# Patient Record
Sex: FEMALE | Race: WHITE | NOT HISPANIC OR LATINO | Employment: FULL TIME | URBAN - METROPOLITAN AREA
[De-identification: names, ages, dates, MRNs, and addresses within clinical notes are randomized per-mention and may not be internally consistent; named-entity substitution may affect disease eponyms.]

---

## 2018-01-22 ENCOUNTER — TELEPHONE (OUTPATIENT)
Dept: OTOLARYNGOLOGY | Facility: CLINIC | Age: 23
End: 2018-01-22

## 2018-01-25 ENCOUNTER — OFFICE VISIT (OUTPATIENT)
Dept: OTOLARYNGOLOGY | Facility: CLINIC | Age: 23
End: 2018-01-25
Payer: OTHER GOVERNMENT

## 2018-01-25 ENCOUNTER — TELEPHONE (OUTPATIENT)
Dept: OTOLARYNGOLOGY | Facility: CLINIC | Age: 23
End: 2018-01-25

## 2018-01-25 VITALS
BODY MASS INDEX: 26.46 KG/M2 | DIASTOLIC BLOOD PRESSURE: 79 MMHG | TEMPERATURE: 98 F | HEIGHT: 64 IN | SYSTOLIC BLOOD PRESSURE: 119 MMHG | HEART RATE: 84 BPM | WEIGHT: 155 LBS

## 2018-01-25 DIAGNOSIS — H60.503 ACUTE OTITIS EXTERNA OF BOTH EARS, UNSPECIFIED TYPE: ICD-10-CM

## 2018-01-25 DIAGNOSIS — J34.2 NASAL SEPTAL DEVIATION: ICD-10-CM

## 2018-01-25 DIAGNOSIS — Z34.91 NORMAL PREGNANCY IN FIRST TRIMESTER: ICD-10-CM

## 2018-01-25 DIAGNOSIS — J30.9 ALLERGIC RHINITIS, UNSPECIFIED CHRONICITY, UNSPECIFIED SEASONALITY, UNSPECIFIED TRIGGER: ICD-10-CM

## 2018-01-25 DIAGNOSIS — H60.8X3 CHRONIC ECZEMATOUS OTITIS EXTERNA OF BOTH EARS: Primary | ICD-10-CM

## 2018-01-25 PROCEDURE — 99204 OFFICE O/P NEW MOD 45 MIN: CPT | Mod: S$GLB,,, | Performed by: SPECIALIST

## 2018-01-25 RX ORDER — NITROFURANTOIN 25; 75 MG/1; MG/1
CAPSULE ORAL
Refills: 0 | COMMUNITY
Start: 2017-11-02

## 2018-01-25 RX ORDER — FLUOCINOLONE ACETONIDE 0.11 MG/ML
3 OIL AURICULAR (OTIC) 2 TIMES DAILY
Qty: 1 BOTTLE | Refills: 5 | Status: SHIPPED | OUTPATIENT
Start: 2018-01-25

## 2018-01-25 RX ORDER — FLUTICASONE PROPIONATE 50 MCG
1 SPRAY, SUSPENSION (ML) NASAL DAILY
COMMUNITY

## 2018-01-25 RX ORDER — CIPROFLOXACIN AND DEXAMETHASONE 3; 1 MG/ML; MG/ML
SUSPENSION/ DROPS AURICULAR (OTIC)
Refills: 3 | COMMUNITY
Start: 2018-01-23

## 2018-01-25 NOTE — PROGRESS NOTES
Subjective:       Patient ID: Tejal Ratliff is a 22 y.o. female.    Chief Complaint: Ear Drainage and Ear Fullness    The patient has been having recurring pain and discharge from her ears for the last 7 months.  She is been diagnosed with swimmer's ear on 2 different occasions and acute otitis media once.  She was a child she had one set of PE tubes.  The ears occasionally are painful.  She does have otorrhea with some brownish material on occasion.  She does use both.  Otic and Ciprodex drops in the past.  She is now 2 months pregnant and her first gestation.  She has not used any eardrops since.  The ears itch and have yellow discharge.      Review of Systems   Constitutional: Negative for activity change, appetite change, chills, fatigue, fever and unexpected weight change.   HENT: Positive for congestion, ear discharge, ear pain and rhinorrhea. Negative for facial swelling, hearing loss, mouth sores, postnasal drip, sinus pain, sinus pressure, sneezing, sore throat, tinnitus, trouble swallowing and voice change.    Eyes: Negative for photophobia, pain, discharge, redness, itching and visual disturbance.   Respiratory: Negative for apnea, cough, choking, shortness of breath and wheezing.    Cardiovascular: Negative for chest pain and palpitations.   Gastrointestinal: Negative for abdominal distention, abdominal pain, nausea and vomiting.   Endocrine:        First trimester pregnancy   Musculoskeletal: Negative for arthralgias, myalgias, neck pain and neck stiffness.   Skin: Negative.  Negative for color change, pallor and rash.   Allergic/Immunologic: Negative for environmental allergies, food allergies and immunocompromised state.   Neurological: Negative for dizziness, facial asymmetry, speech difficulty, weakness, light-headedness, numbness and headaches.   Hematological: Negative for adenopathy. Does not bruise/bleed easily.   Psychiatric/Behavioral: Negative for confusion, decreased concentration and  sleep disturbance.       Objective:      Physical Exam   Constitutional: She is oriented to person, place, and time. She appears well-developed and well-nourished. She is cooperative.   HENT:   Head: Normocephalic.   Right Ear: External ear normal. There is drainage and swelling. Tympanic membrane is retracted.   Left Ear: External ear normal. There is drainage and swelling. Tympanic membrane is retracted.   Nose: Mucosal edema (cyanotic, boggy inferior turbinates bilaterally), rhinorrhea (clear mucus bilaterally) and septal deviation (to the right) present.   Mouth/Throat: Uvula is midline, oropharynx is clear and moist and mucous membranes are normal.   Eyes: EOM and lids are normal. Pupils are equal, round, and reactive to light. Right eye exhibits no discharge and no exudate. Left eye exhibits no discharge and no exudate. Right conjunctiva is injected. Left conjunctiva is injected.   Neck: Trachea normal and normal range of motion. No muscular tenderness present. No tracheal deviation present. No thyroid mass and no thyromegaly present.   Cardiovascular: Normal rate, regular rhythm, normal heart sounds and normal pulses.    Pulmonary/Chest: Effort normal and breath sounds normal. No stridor. She has no wheezes. She has no rhonchi. She has no rales.   Abdominal: Soft. Bowel sounds are normal. There is no tenderness.   Musculoskeletal: Normal range of motion.   Lymphadenopathy:        Head (right side): No submental, no submandibular, no preauricular, no posterior auricular and no occipital adenopathy present.        Head (left side): No submental, no submandibular, no preauricular, no posterior auricular and no occipital adenopathy present.     She has no cervical adenopathy.   Neurological: She is alert and oriented to person, place, and time. She has normal strength. No cranial nerve deficit or sensory deficit. Gait normal.   Skin: Skin is warm and dry. No petechiae and no rash noted. No cyanosis. Nails show no  clubbing.   Psychiatric: She has a normal mood and affect. Her speech is normal and behavior is normal. Judgment and thought content normal. Cognition and memory are normal.       Assessment:       1. Chronic eczematous otitis externa of both ears    2. Acute otitis externa of both ears, unspecified type    3. Allergic rhinitis, unspecified chronicity, unspecified seasonality, unspecified trigger    4. Nasal septal deviation    5. Normal pregnancy in first trimester        Plan:       I had a telephone consultation with the patient's obstetrician.  He has no objection to her using the Dermotic drops in the first trimester of her pregnancy.  I will recheck her in 2 weeks.  If she develops pain in the ears she will switch to Ciprodex drops.  If her ears feel dry she can use baby oil placed in the ears at nighttime.

## 2018-01-25 NOTE — TELEPHONE ENCOUNTER
Called and spoke with patient today per . Yasmany states he spoke with your Dr. Ramses Daniel, OB/GYN and he approved her to use the drop he prescribed for her doing pregnancy. Pt states ok thanks.

## 2018-01-25 NOTE — PATIENT INSTRUCTIONS
Understanding Outer Ear Problems     Normal ear   Children often get an earache. Specific treatment may or may not be needed. It's best to check with your healthcare provider. Ear pain can be caused by a problem in the outer or middle ear. Or it can be someplace else, such as an infected throat. Usually, outer ear problems don't cause fever, but this isn't always the case. Use the checklist below to help you figure out if the problem is with the outer ear.  What are outer ear problems?  Outer ear problems occur in the area between the external part of the ear (auricle) and the eardrum. The eardrum is the thin sheet of tissue that passes sound waves between the outer and middle ear. These problems are often because of excess earwax buildup or infection.     Signs of outer ear problems  Call your child's healthcare provider if you are unsure or if your child is young. Its probably an outer ear problem if you can say yes to any of the following:  · My childs outer ear aches or feels blocked.  · The pain gets worse when I wiggle my childs ear.  · My child's outer ear is red or swollen.  · My child went swimming recently.   Date Last Reviewed: 11/1/2016  © 3578-4654 [employer]. [address] [place] [address], [place] [address]. All rights reserved. This information is not intended as a substitute for professional medical care. Always follow your healthcare professional's instructions.

## 2018-01-26 ENCOUNTER — TELEPHONE (OUTPATIENT)
Dept: OTOLARYNGOLOGY | Facility: CLINIC | Age: 23
End: 2018-01-26

## 2018-01-26 NOTE — TELEPHONE ENCOUNTER
----- Message from Cristela Beavers sent at 1/26/2018 10:00 AM CST -----  Contact: pt      _  1st Request  _  2nd Request  _  3rd Request    Please refill the medication(s) listed below. Please call the patient when the prescription(s) is ready for  at this phone number      [phone]      Medication #1fluocinolone acetonide oil (DERMOTIC OIL) 0.01 % Drop     Medication #2CIPRODEX 0.3-0.1 % DrpS       [facility]acy[facility]

## 2018-01-26 NOTE — TELEPHONE ENCOUNTER
Talk with pharmacy for Ms Ratliff to see what's the hold up on Rx. Pharmacy don't have the med's in stock, [facility]

## 2018-02-08 ENCOUNTER — OFFICE VISIT (OUTPATIENT)
Dept: OTOLARYNGOLOGY | Facility: CLINIC | Age: 23
End: 2018-02-08
Payer: OTHER GOVERNMENT

## 2018-02-08 VITALS
SYSTOLIC BLOOD PRESSURE: 115 MMHG | BODY MASS INDEX: 26.46 KG/M2 | HEIGHT: 64 IN | WEIGHT: 155 LBS | HEART RATE: 85 BPM | DIASTOLIC BLOOD PRESSURE: 75 MMHG

## 2018-02-08 DIAGNOSIS — J34.2 NASAL SEPTAL DEVIATION: ICD-10-CM

## 2018-02-08 DIAGNOSIS — J30.89 CHRONIC NON-SEASONAL ALLERGIC RHINITIS, UNSPECIFIED TRIGGER: ICD-10-CM

## 2018-02-08 DIAGNOSIS — H60.8X3 CHRONIC ECZEMATOUS OTITIS EXTERNA OF BOTH EARS: Primary | ICD-10-CM

## 2018-02-08 DIAGNOSIS — B37.0 ORAL CANDIDIASIS: ICD-10-CM

## 2018-02-08 DIAGNOSIS — H60.503 ACUTE OTITIS EXTERNA OF BOTH EARS, UNSPECIFIED TYPE: ICD-10-CM

## 2018-02-08 PROCEDURE — 99213 OFFICE O/P EST LOW 20 MIN: CPT | Mod: S$GLB,,, | Performed by: SPECIALIST

## 2018-02-08 PROCEDURE — 3008F BODY MASS INDEX DOCD: CPT | Mod: S$GLB,,, | Performed by: SPECIALIST

## 2018-02-08 RX ORDER — NYSTATIN 100000 [USP'U]/ML
5 SUSPENSION ORAL 4 TIMES DAILY
Qty: 240 ML | Refills: 2 | Status: SHIPPED | OUTPATIENT
Start: 2018-02-08 | End: 2018-02-22

## 2018-02-08 RX ORDER — BETAMETHASONE DIPROPIONATE 0.5 MG/G
CREAM TOPICAL 2 TIMES DAILY
Qty: 15 G | Refills: 5 | Status: SHIPPED | OUTPATIENT
Start: 2018-02-08 | End: 2018-03-10

## 2018-02-08 NOTE — PATIENT INSTRUCTIONS
Oral Candida Infection (Thrush) in Your Child  Candida is a type of fungus. It is found naturally on the skin and in the mouth. If Candida grows out of control, it can cause mouth infection called thrush. Thrush is common in infants and children. Thrush is not a serious problem for a healthy child.  Whos at risk?  Thrush is common in infants and toddlers. Risk factors for infant thrush include:  · Very low birth weight  · Passing through the birth canal of a mother with a yeast infection  · Use of antibiotics  · Use of inhaled steroids, such as for asthma  · Frequent use of a pacifier  · Weakened immune system  Symptoms of thrush  Thrush causes creamy white patches to form on the tongue or inner cheeks. These patches can be painful and may bleed. Babies with thrush are often fussy and may have trouble feeding.  Treatment for thrush  A healthy baby with mild thrush may not need any treatment. More severe cases are likely to be treated with a liquid antifungal medicine. Or the medicine may be given as lozenges or pills. Follow the healthcare provider's instructions for giving this medicine to your child.  Breastfeeding mothers may develop thrush on their nipples. If you breastfeed, both you and your child will be treated. This is to prevent passing the infection back and forth.  Caring for your child at home  Make sure to do the following:  · Wash your hands well with warm water and soap before and after caring for your child. Have your child wash his or her hands often.  · If your child uses a pacifier, boil it for 5 to 10 minutes at least once a day.  · Wash drinking cups well using warm water and soap after each use.  · If your child takes inhaled corticosteroids, have your child rinse his or her mouth after taking the medicine. Also ask the child's healthcare provider about using a spacer. This can help lessen the risk for thrush.  Your child can likely go to school or , unless the healthcare provider  says otherwise.  When to call the healthcare provider  Call the healthcare provider right away if:  · Your child is 3 months old or younger and has a fever of 100.4°F (38°C) or higher. Get medical care right away. Fever in a young baby can be a sign of a dangerous infection.  · Your child is younger than 2 years of age and has a fever of 100.4°F (38°C) that continues for more than 1 day.  · Your child is 2 years old or older and has a fever of 100.4°F (38°C) that continues for more than 3 days.  · Your child is of any age and has repeated fevers above 104°F (40°C).  Also call the healthcare provider if your child:  · Stops eating or drinking  · Has pain that doesnt go away, or gets worse  · Has other symptoms that get worse  · Has repeated thrush infections   Date Last Reviewed: 10/1/2016  © 9866-5725 [employer], [facility]. [address] [place] [address], [place] [address]. All rights reserved. This information is not intended as a substitute for professional medical care. Always follow your healthcare professional's instructions.

## 2018-02-08 NOTE — PROGRESS NOTES
Subjective:       Patient ID: Tejal Ratliff is a 22 y.o. female.    Chief Complaint: Follow-up    The patient is coming in for a follow-up visit.  She has no pain in either ear but continues to have some thin watery drainage on the left.  She has had a history of recurring rashes and itching of the external ears.  She recently had to have a steroid shot and antibiotics for sinus infection.  She developed white coating in her mouth with sensitivity to acidic drinks since then.      Review of Systems   Constitutional: Negative for activity change, appetite change, chills, fatigue, fever and unexpected weight change.   HENT: Positive for congestion, ear pain, mouth sores, postnasal drip, rhinorrhea and sore throat. Negative for ear discharge, facial swelling, hearing loss, sinus pain, sinus pressure, sneezing, tinnitus, trouble swallowing and voice change.    Eyes: Negative for photophobia, pain, discharge, redness, itching and visual disturbance.   Respiratory: Positive for cough. Negative for apnea, choking, shortness of breath and wheezing.    Cardiovascular: Negative for chest pain and palpitations.   Gastrointestinal: Negative for abdominal distention, abdominal pain, nausea and vomiting.   Musculoskeletal: Negative for arthralgias, myalgias, neck pain and neck stiffness.   Skin: Negative.  Negative for color change, pallor and rash.   Allergic/Immunologic: Negative for environmental allergies, food allergies and immunocompromised state.   Neurological: Positive for headaches. Negative for dizziness, facial asymmetry, speech difficulty, weakness, light-headedness and numbness.   Hematological: Negative for adenopathy. Does not bruise/bleed easily.   Psychiatric/Behavioral: Negative for confusion, decreased concentration and sleep disturbance.       Objective:      Physical Exam   Constitutional: She is oriented to person, place, and time. She appears well-developed and well-nourished. She is cooperative.    HENT:   Head: Normocephalic.   Right Ear: External ear and ear canal normal. Tympanic membrane is retracted.   Left Ear: External ear and ear canal normal. Tympanic membrane is retracted.   Nose: Mucosal edema (cyanotic, boggy inferior turbinates bilaterally), rhinorrhea (clear mucus bilaterally) and septal deviation (to the right) present.   Mouth/Throat: Uvula is midline, oropharynx is clear and moist and mucous membranes are normal. Oral lesions (white coating on the dorsum of the tongue) present. No oropharyngeal exudate.   Eyes: EOM and lids are normal. Pupils are equal, round, and reactive to light. Right eye exhibits no discharge and no exudate. Left eye exhibits no discharge and no exudate. Right conjunctiva is injected. Left conjunctiva is injected.   Neck: Trachea normal and normal range of motion. No muscular tenderness present. No tracheal deviation present. No thyroid mass and no thyromegaly present.   Cardiovascular: Normal rate, regular rhythm, normal heart sounds and normal pulses.    Pulmonary/Chest: Effort normal and breath sounds normal. No stridor. She has no wheezes. She has no rhonchi. She has no rales.   Abdominal: Soft. Bowel sounds are normal. There is no tenderness.   Musculoskeletal: Normal range of motion.   Lymphadenopathy:        Head (right side): No submental, no submandibular, no preauricular, no posterior auricular and no occipital adenopathy present.        Head (left side): No submental, no submandibular, no preauricular, no posterior auricular and no occipital adenopathy present.     She has no cervical adenopathy.   Neurological: She is alert and oriented to person, place, and time. She has normal strength. No cranial nerve deficit or sensory deficit. Gait normal.   Skin: Skin is warm and dry. No petechiae and no rash noted. No cyanosis. Nails show no clubbing.   Psychiatric: She has a normal mood and affect. Her speech is normal and behavior is normal. Judgment and thought  content normal. Cognition and memory are normal.       Assessment:       1. Chronic eczematous otitis externa of both ears    2. Nasal septal deviation    3. Acute otitis externa of both ears, unspecified type    4. Oral candidiasis    5. Chronic non-seasonal allergic rhinitis, unspecified trigger        Plan:       I will recheck the patient in 2 weeks at the end of her nystatin therapy.  She is to use a Diprolene cream when she develops a rash with itching on her ears.  I will schedule her for ALLERGY testing.

## 2018-03-07 ENCOUNTER — HOSPITAL ENCOUNTER (EMERGENCY)
Facility: HOSPITAL | Age: 23
Discharge: HOME | End: 2018-03-08
Attending: EMERGENCY MEDICINE
Payer: OTHER GOVERNMENT

## 2018-03-07 DIAGNOSIS — Z3A.15 15 WEEKS GESTATION OF PREGNANCY: ICD-10-CM

## 2018-03-07 DIAGNOSIS — Z34.92 NORMAL INTRAUTERINE PREGNANCY ON PRENATAL ULTRASOUND IN SECOND TRIMESTER: ICD-10-CM

## 2018-03-07 DIAGNOSIS — O20.9 VAGINAL BLEEDING BEFORE 22 WEEKS GESTATION: Primary | ICD-10-CM

## 2018-03-07 LAB
BACTERIA #/AREA URNS HPF: NORMAL /HPF
BILIRUB UR QL STRIP: NEGATIVE
CLARITY UR: CLEAR
COLOR UR: ABNORMAL
GLUCOSE UR QL STRIP: NEGATIVE
HGB UR QL STRIP: ABNORMAL
KETONES UR QL STRIP: NEGATIVE
LEUKOCYTE ESTERASE UR QL STRIP: ABNORMAL
MICROSCOPIC COMMENT: NORMAL
NITRITE UR QL STRIP: NEGATIVE
PH UR STRIP: 6 [PH] (ref 5–8)
PROT UR QL STRIP: NEGATIVE
RBC #/AREA URNS HPF: 3 /HPF (ref 0–4)
SP GR UR STRIP: 1 (ref 1–1.03)
SQUAMOUS #/AREA URNS HPF: 3 /HPF
URN SPEC COLLECT METH UR: ABNORMAL
UROBILINOGEN UR STRIP-ACNC: NEGATIVE EU/DL
WBC #/AREA URNS HPF: 5 /HPF (ref 0–5)

## 2018-03-07 PROCEDURE — 87086 URINE CULTURE/COLONY COUNT: CPT

## 2018-03-07 PROCEDURE — 99284 EMERGENCY DEPT VISIT MOD MDM: CPT

## 2018-03-07 PROCEDURE — 81000 URINALYSIS NONAUTO W/SCOPE: CPT

## 2018-03-07 PROCEDURE — 80053 COMPREHEN METABOLIC PANEL: CPT

## 2018-03-07 PROCEDURE — 85025 COMPLETE CBC W/AUTO DIFF WBC: CPT

## 2018-03-07 PROCEDURE — 84702 CHORIONIC GONADOTROPIN TEST: CPT

## 2018-03-07 PROCEDURE — 86901 BLOOD TYPING SEROLOGIC RH(D): CPT

## 2018-03-08 VITALS
WEIGHT: 154 LBS | TEMPERATURE: 98 F | BODY MASS INDEX: 27.29 KG/M2 | DIASTOLIC BLOOD PRESSURE: 70 MMHG | HEIGHT: 63 IN | HEART RATE: 72 BPM | OXYGEN SATURATION: 100 % | SYSTOLIC BLOOD PRESSURE: 114 MMHG | RESPIRATION RATE: 18 BRPM

## 2018-03-08 LAB
ABO + RH BLD: NORMAL
ALBUMIN SERPL BCP-MCNC: 3.9 G/DL
ALP SERPL-CCNC: 99 U/L
ALT SERPL W/O P-5'-P-CCNC: 22 U/L
ANION GAP SERPL CALC-SCNC: 12 MMOL/L
AST SERPL-CCNC: 18 U/L
BASOPHILS # BLD AUTO: 0.02 K/UL
BASOPHILS NFR BLD: 0.1 %
BILIRUB SERPL-MCNC: 0.2 MG/DL
BUN SERPL-MCNC: 6 MG/DL
CALCIUM SERPL-MCNC: 10.2 MG/DL
CHLORIDE SERPL-SCNC: 104 MMOL/L
CO2 SERPL-SCNC: 22 MMOL/L
CREAT SERPL-MCNC: 0.7 MG/DL
DIFFERENTIAL METHOD: ABNORMAL
EOSINOPHIL # BLD AUTO: 0.1 K/UL
EOSINOPHIL NFR BLD: 1 %
ERYTHROCYTE [DISTWIDTH] IN BLOOD BY AUTOMATED COUNT: 13.4 %
EST. GFR  (AFRICAN AMERICAN): >60 ML/MIN/1.73 M^2
EST. GFR  (NON AFRICAN AMERICAN): >60 ML/MIN/1.73 M^2
GLUCOSE SERPL-MCNC: 83 MG/DL
HCG INTACT+B SERPL-ACNC: NORMAL MIU/ML
HCT VFR BLD AUTO: 38.2 %
HGB BLD-MCNC: 13 G/DL
LYMPHOCYTES # BLD AUTO: 3.4 K/UL
LYMPHOCYTES NFR BLD: 23.4 %
MCH RBC QN AUTO: 29.3 PG
MCHC RBC AUTO-ENTMCNC: 34 G/DL
MCV RBC AUTO: 86 FL
MONOCYTES # BLD AUTO: 1 K/UL
MONOCYTES NFR BLD: 6.6 %
NEUTROPHILS # BLD AUTO: 9.8 K/UL
NEUTROPHILS NFR BLD: 68.7 %
PLATELET # BLD AUTO: 275 K/UL
PMV BLD AUTO: 11.3 FL
POTASSIUM SERPL-SCNC: 3.3 MMOL/L
PROT SERPL-MCNC: 7.9 G/DL
RBC # BLD AUTO: 4.43 M/UL
SODIUM SERPL-SCNC: 138 MMOL/L
WBC # BLD AUTO: 14.31 K/UL

## 2018-03-08 NOTE — DISCHARGE INSTRUCTIONS
Your ultrasound and blood work is normal. Follow-up with your OB/GYN in the next week for further evaluation and management.    Take prenatal vitamins with folic acid daily if your not already doing so.    Return to the emergency for any new or worsening symptoms.

## 2018-03-08 NOTE — ED PROVIDER NOTES
"Encounter Date: 3/7/2018    SCRIBE #1 NOTE: I, Lisa Annie, am scribing for, and in the presence of,  Marcos Parikh NP. I have scribed the following portions of the note - Other sections scribed: ROS and HPI.       History     Chief Complaint   Patient presents with    Vaginal Bleeding     15 weeks pregnant. "I noticed bleeding 1 hour ago." Bright red blood. Pt currently spotting. First pregnancy.     CC: Vaginal Bleeding    HPI: This 22 y.o. female with  a past medical history of Anemia, who is 15 weeks pregnant (stated), presents to ED c/o vaginal bleeding since this morning. She reports similar episode of vaginal bleeding 2 weeks ago. Her OB/GYN, Dr. Ramses García, advised her to refrain from running and sex. She reports more blood with her current episode. Denies abdominal cramping or seeing clots. Denies N/V or any other urinary sx. Skyler any other complications during her pregnancy. No prior medical intervention.               The history is provided by the patient. No  was used.     Review of patient's allergies indicates:   Allergen Reactions    Sulfa (sulfonamide antibiotics) Rash     Past Medical History:   Diagnosis Date    Anemia      Past Surgical History:   Procedure Laterality Date    TYMPANOSTOMY TUBE PLACEMENT       History reviewed. No pertinent family history.  Social History   Substance Use Topics    Smoking status: Never Smoker    Smokeless tobacco: Never Used    Alcohol use No     Review of Systems   Constitutional: Negative for fever.   HENT: Negative for congestion.    Respiratory: Negative for cough.    Gastrointestinal: Negative for abdominal pain, diarrhea, nausea and vomiting.   Genitourinary: Positive for vaginal bleeding. Negative for dysuria, frequency and vaginal discharge.   Neurological: Negative for headaches.       Physical Exam     Initial Vitals [03/07/18 2244]   BP Pulse Resp Temp SpO2   (!) 140/94 78 18 97.9 °F (36.6 °C) 100 %      MAP       109.33 "         Physical Exam    Nursing note and vitals reviewed.  Constitutional: She appears well-developed and well-nourished. She is not diaphoretic. No distress.   HENT:   Head: Normocephalic and atraumatic.   Right Ear: External ear normal.   Left Ear: External ear normal.   Nose: Nose normal.   Eyes: Conjunctivae and EOM are normal. Pupils are equal, round, and reactive to light. Right eye exhibits no discharge. Left eye exhibits no discharge. No scleral icterus.   Neck: Normal range of motion. Neck supple. No thyromegaly present. No tracheal deviation present. No JVD present.   Cardiovascular: Normal rate, regular rhythm, normal heart sounds and intact distal pulses. Exam reveals no gallop and no friction rub.    No murmur heard.  Pulmonary/Chest: Breath sounds normal. No stridor. No respiratory distress. She has no wheezes. She has no rhonchi. She has no rales.   Abdominal: Soft. Bowel sounds are normal. She exhibits no distension and no mass. There is no tenderness. There is no rebound and no guarding.   Genitourinary: Uterus normal. Pelvic exam was performed with patient supine. Cervix exhibits no motion tenderness. Right adnexum displays no mass, no tenderness and no fullness. Left adnexum displays no mass, no tenderness and no fullness. There is bleeding in the vagina. No erythema or tenderness in the vagina. No foreign body in the vagina. No signs of injury around the vagina. No vaginal discharge found.   Genitourinary Comments: Scant dark blood in the vaginal vault. The cervical os is closed. There is no discharge. No adnexal or uterine tenderness to palpation. No CMT.   Musculoskeletal: Normal range of motion. She exhibits no edema or tenderness.   Lymphadenopathy:     She has no cervical adenopathy.   Neurological: She is alert and oriented to person, place, and time. She has normal strength and normal reflexes. She displays normal reflexes. No cranial nerve deficit or sensory deficit.   Skin: Skin is warm  and dry. No rash and no abscess noted. No erythema. No pallor.   Psychiatric: She has a normal mood and affect. Her behavior is normal. Judgment and thought content normal.         ED Course   Procedures  Labs Reviewed   URINALYSIS - Abnormal; Notable for the following:        Result Value    Specific Gravity, UA 1.000 (*)     Occult Blood UA 3+ (*)     Leukocytes, UA 1+ (*)     All other components within normal limits   CBC W/ AUTO DIFFERENTIAL - Abnormal; Notable for the following:     WBC 14.31 (*)     Gran # (ANC) 9.8 (*)     All other components within normal limits   COMPREHENSIVE METABOLIC PANEL - Abnormal; Notable for the following:     Potassium 3.3 (*)     CO2 22 (*)     All other components within normal limits   CULTURE, URINE   HCG, QUANTITATIVE, PREGNANCY   URINALYSIS MICROSCOPIC   POCT URINE PREGNANCY   GROUP & RH             Medical Decision Making:   Differential Diagnosis:   Includes ectopic pregnancy, spontaneous , incomplete , subchorionic hemorrhage, placenta previa, others  Clinical Tests:   Lab Tests: Ordered and Reviewed  Radiological Study: Ordered and Reviewed  ED Management:  22-year-old nontoxic female presenting for evaluation of vaginal bleeding that began this morning. Patient states she has had only mild bleeding and there has been no clotting. Her OB[facility]N is at [facility]. She denies abdominal pain, suprapubic pain, back pain, dysuria, vaginal discharge, or any additional symptoms. She is afebrile, well-appearing, comfortable, in no distress. Vital signs are within normal limits. Abdomen is soft and nontender without rigidity or guarding. No CVA tenderness bilaterally. There is scant dark red blood in the vaginal vault. The cervical os is closed. There is no discharge. No CMT. No adnexal or uterine TTP. Labs are unremarkable. Blood type is A+. Ultrasound shows single live IUP corresponding to 15 weeks and 0 days with fetal heart rate of 144 BPM. No  source of the patient's vaginal bleeding is confirmed, however there is no evidence of ectopic pregnancy or subchorionic hemorrhage. Recommended follow-up with patient's OB/GYN in the next week. ED return precautions given. Patient expressed understanding of diagnosis and discharge instructions.  Other:   I have discussed this case with another health care provider.       <> Summary of the Discussion: Case discussed with my attending physician who agreed with the assessment and plan.            Scribe Attestation:   Scribe #1: I performed the above scribed service and the documentation accurately describes the services I performed. I attest to the accuracy of the note.    Attending Attestation:           Physician Attestation for Scribe:  Physician Attestation Statement for Scribe #1: I, Marcos Parikh NP, reviewed documentation, as scribed by Lisa Valencia in my presence, and it is both accurate and complete.                    Clinical Impression:   The primary encounter diagnosis was Vaginal bleeding before 22 weeks gestation. Diagnoses of Normal intrauterine pregnancy on prenatal ultrasound in second trimester and 15 weeks gestation of pregnancy were also pertinent to this visit.    Disposition:   Disposition: Discharged  Condition: Stable                        Marcos Parikh NP  03/08/18 0241

## 2018-03-08 NOTE — ED NOTES
Patient presented to the ED stating that she is 15 weeks pregnant and she is bleeding vaginally. Patient stated that it is bright red and a small amount. Patient stated this happened about 2 weeks ago at her doctors office and she was told if it happened again to come here.

## 2018-03-10 LAB — BACTERIA UR CULT: NORMAL
